# Patient Record
(demographics unavailable — no encounter records)

---

## 2025-07-23 NOTE — HISTORY OF PRESENT ILLNESS
[Post-hospitalization from ___ Hospital] : Post-hospitalization from [unfilled] Hospital [Admitted on: ___] : The patient was admitted on [unfilled] [Discharged on ___] : discharged on [unfilled] [Discharge Summary] : discharge summary [Discharge Med List] : discharge medication list [Med Reconciliation] : medication reconciliation has been completed [Patient Contacted By: ____] : and contacted by [unfilled] [FreeTextEntry2] : Copied from EMR, "Hospital Course 91 year old female w/PMHx MM, HTN (no meds per family) sent in by NH for fever, admitted for community acquired pneumonia. Patient had CXR done in hospital showing right lower lobe pneumonia. Patient required supplemental oxygen initially but was able to be weaned off. Patient was started on ceftriaxone and azithromycin while in hospital, will be discharged with levaquin to complete 5 day course. Patient is medically stable for discharge. Will follow up with her PCP within 10 days. No changes being made to chronic home medications."  Pt seem in her [facility] for transitional care management. Reports of pt being lethargic, weak since d/c. Pt seen in wheelchair, awake and had lunch in . She reports fatigue, no SOB, fever, chills or cough.

## 2025-07-23 NOTE — PHYSICAL EXAM
[No Acute Distress] : no acute distress [Well Nourished] : well nourished [Well Developed] : well developed [Well-Appearing] : well-appearing [No Respiratory Distress] : no respiratory distress  [No Accessory Muscle Use] : no accessory muscle use [Normal Rate] : normal rate  [Regular Rhythm] : with a regular rhythm [Normal S1, S2] : normal S1 and S2 [No Murmur] : no murmur heard [Pedal Pulses Present] : the pedal pulses are present [No Edema] : there was no peripheral edema [Memory Grossly Normal] : memory grossly normal [Normal Affect] : the affect was normal [Alert and Oriented x3] : oriented to person, place, and time [Normal Mood] : the mood was normal [de-identified] : bilat BS diminished at bases